# Patient Record
Sex: MALE | Race: WHITE | Employment: FULL TIME | URBAN - METROPOLITAN AREA
[De-identification: names, ages, dates, MRNs, and addresses within clinical notes are randomized per-mention and may not be internally consistent; named-entity substitution may affect disease eponyms.]

---

## 2023-09-01 ENCOUNTER — OFFICE VISIT (OUTPATIENT)
Dept: FAMILY MEDICINE CLINIC | Age: 23
End: 2023-09-01

## 2023-09-01 VITALS
HEIGHT: 66 IN | SYSTOLIC BLOOD PRESSURE: 122 MMHG | BODY MASS INDEX: 25.94 KG/M2 | RESPIRATION RATE: 12 BRPM | DIASTOLIC BLOOD PRESSURE: 70 MMHG | HEART RATE: 86 BPM | WEIGHT: 161.38 LBS | TEMPERATURE: 97.4 F | OXYGEN SATURATION: 99 %

## 2023-09-01 DIAGNOSIS — R50.9 FEVER, UNSPECIFIED: ICD-10-CM

## 2023-09-01 DIAGNOSIS — J01.00 ACUTE MAXILLARY SINUSITIS, RECURRENCE NOT SPECIFIED: Primary | ICD-10-CM

## 2023-09-01 LAB
INFLUENZA A ANTIBODY: NEGATIVE
INFLUENZA B ANTIBODY: NEGATIVE
Lab: NORMAL
PERFORMING INSTRUMENT: NORMAL
QC PASS/FAIL: NORMAL
SARS-COV-2, POC: NORMAL

## 2023-09-01 RX ORDER — AMOXICILLIN AND CLAVULANATE POTASSIUM 875; 125 MG/1; MG/1
1 TABLET, FILM COATED ORAL 2 TIMES DAILY
Qty: 14 TABLET | Refills: 0 | Status: SHIPPED | OUTPATIENT
Start: 2023-09-01 | End: 2023-09-08

## 2023-09-01 RX ORDER — FLUTICASONE PROPIONATE 50 MCG
2 SPRAY, SUSPENSION (ML) NASAL DAILY
Qty: 1 EACH | Refills: 0 | Status: SHIPPED | OUTPATIENT
Start: 2023-09-01 | End: 2023-10-01

## 2023-09-01 SDOH — ECONOMIC STABILITY: INCOME INSECURITY: HOW HARD IS IT FOR YOU TO PAY FOR THE VERY BASICS LIKE FOOD, HOUSING, MEDICAL CARE, AND HEATING?: NOT HARD AT ALL

## 2023-09-01 SDOH — ECONOMIC STABILITY: FOOD INSECURITY: WITHIN THE PAST 12 MONTHS, YOU WORRIED THAT YOUR FOOD WOULD RUN OUT BEFORE YOU GOT MONEY TO BUY MORE.: NEVER TRUE

## 2023-09-01 SDOH — ECONOMIC STABILITY: HOUSING INSECURITY
IN THE LAST 12 MONTHS, WAS THERE A TIME WHEN YOU DID NOT HAVE A STEADY PLACE TO SLEEP OR SLEPT IN A SHELTER (INCLUDING NOW)?: NO

## 2023-09-01 SDOH — ECONOMIC STABILITY: FOOD INSECURITY: WITHIN THE PAST 12 MONTHS, THE FOOD YOU BOUGHT JUST DIDN'T LAST AND YOU DIDN'T HAVE MONEY TO GET MORE.: NEVER TRUE

## 2023-09-01 ASSESSMENT — PATIENT HEALTH QUESTIONNAIRE - PHQ9
SUM OF ALL RESPONSES TO PHQ QUESTIONS 1-9: 0
2. FEELING DOWN, DEPRESSED OR HOPELESS: 0
SUM OF ALL RESPONSES TO PHQ QUESTIONS 1-9: 0
1. LITTLE INTEREST OR PLEASURE IN DOING THINGS: 0
SUM OF ALL RESPONSES TO PHQ9 QUESTIONS 1 & 2: 0

## 2023-09-01 ASSESSMENT — ENCOUNTER SYMPTOMS
ABDOMINAL PAIN: 0
FACIAL SWELLING: 1
SINUS PAIN: 1
VOMITING: 0
SHORTNESS OF BREATH: 0
SORE THROAT: 0
NAUSEA: 0
RHINORRHEA: 1
SINUS PRESSURE: 1
TROUBLE SWALLOWING: 0
VOICE CHANGE: 0
COUGH: 0
CHEST TIGHTNESS: 0
DIARRHEA: 0
WHEEZING: 0

## 2023-09-01 NOTE — PROGRESS NOTES
membrane, ear canal and external ear normal. There is no impacted cerumen. Nose: Congestion present. Right Turbinates: Swollen. Left Turbinates: Swollen. Right Sinus: Maxillary sinus tenderness and frontal sinus tenderness present. Left Sinus: No maxillary sinus tenderness or frontal sinus tenderness. Mouth/Throat:      Lips: Pink. Mouth: Mucous membranes are moist. No oral lesions. Tongue: No lesions. Palate: No lesions. Pharynx: Oropharynx is clear. Uvula midline. No pharyngeal swelling, oropharyngeal exudate, posterior oropharyngeal erythema or uvula swelling. Eyes:      General: Lids are normal.      Extraocular Movements: Extraocular movements intact. Conjunctiva/sclera: Conjunctivae normal.   Neck:      Trachea: Trachea normal.   Cardiovascular:      Rate and Rhythm: Normal rate and regular rhythm. Pulses: Normal pulses. Heart sounds: Normal heart sounds, S1 normal and S2 normal.   Pulmonary:      Effort: Pulmonary effort is normal.      Breath sounds: Normal breath sounds and air entry. Musculoskeletal:         General: Normal range of motion. Cervical back: Normal range of motion and neck supple. No tenderness. Lymphadenopathy:      Cervical: No cervical adenopathy. Skin:     General: Skin is warm and dry. Capillary Refill: Capillary refill takes less than 2 seconds. Neurological:      General: No focal deficit present. Mental Status: He is alert and oriented to person, place, and time. Mental status is at baseline. Psychiatric:         Attention and Perception: Attention and perception normal.         Mood and Affect: Mood and affect normal.         Speech: Speech normal.         Behavior: Behavior normal. Behavior is cooperative. Thought Content: Thought content normal.         Cognition and Memory: Cognition and memory normal.         Judgment: Judgment normal.       Assessment:       Diagnosis Orders   1.

## 2024-08-19 ENCOUNTER — TELEPHONE (OUTPATIENT)
Dept: FAMILY MEDICINE CLINIC | Age: 24
End: 2024-08-19

## 2024-08-19 ENCOUNTER — OFFICE VISIT (OUTPATIENT)
Dept: FAMILY MEDICINE CLINIC | Age: 24
End: 2024-08-19
Payer: COMMERCIAL

## 2024-08-19 VITALS
HEIGHT: 66 IN | WEIGHT: 180.2 LBS | OXYGEN SATURATION: 98 % | HEART RATE: 83 BPM | BODY MASS INDEX: 28.96 KG/M2 | DIASTOLIC BLOOD PRESSURE: 74 MMHG | TEMPERATURE: 97 F | SYSTOLIC BLOOD PRESSURE: 128 MMHG

## 2024-08-19 DIAGNOSIS — N50.89 TESTICLE LUMP: Primary | ICD-10-CM

## 2024-08-19 DIAGNOSIS — N45.1 EPIDIDYMITIS, LEFT: ICD-10-CM

## 2024-08-19 PROCEDURE — G8419 CALC BMI OUT NRM PARAM NOF/U: HCPCS | Performed by: FAMILY MEDICINE

## 2024-08-19 PROCEDURE — 1036F TOBACCO NON-USER: CPT | Performed by: FAMILY MEDICINE

## 2024-08-19 PROCEDURE — G8427 DOCREV CUR MEDS BY ELIG CLIN: HCPCS | Performed by: FAMILY MEDICINE

## 2024-08-19 PROCEDURE — 99214 OFFICE O/P EST MOD 30 MIN: CPT | Performed by: FAMILY MEDICINE

## 2024-08-19 RX ORDER — LAMOTRIGINE 150 MG/1
150 TABLET ORAL DAILY
COMMUNITY
Start: 2024-07-28

## 2024-08-19 RX ORDER — DOXYCYCLINE HYCLATE 100 MG
100 TABLET ORAL 2 TIMES DAILY
Qty: 14 TABLET | Refills: 0 | Status: SHIPPED | OUTPATIENT
Start: 2024-08-19 | End: 2024-08-26

## 2024-08-19 ASSESSMENT — PATIENT HEALTH QUESTIONNAIRE - PHQ9
SUM OF ALL RESPONSES TO PHQ QUESTIONS 1-9: 0
SUM OF ALL RESPONSES TO PHQ9 QUESTIONS 1 & 2: 0
1. LITTLE INTEREST OR PLEASURE IN DOING THINGS: NOT AT ALL
SUM OF ALL RESPONSES TO PHQ QUESTIONS 1-9: 0
2. FEELING DOWN, DEPRESSED OR HOPELESS: NOT AT ALL

## 2024-08-19 NOTE — TELEPHONE ENCOUNTER
----- Message from Nicho DELGADILLO sent at 8/19/2024  8:29 AM EDT -----  Regarding: ECC Escalation To Practice  ECC Escalation To Practice      Type of Escalation: Red Flag Symptom  --------------------------------------------------------------------------------------------------------------------------    Information for Provider:  Patient is looking for appointment for: Symptom genital pain.  Reasons for Message: Unable to reach practice     Additional Information patient do not want walk in clinic  --------------------------------------------------------------------------------------------------------------------------    Relationship to Patient: Guardian ,grandmother: Fabiola    Call Back Info: OK to leave message on voicemail  Preferred Call Back Number: Phone:[phone]

## 2024-08-19 NOTE — TELEPHONE ENCOUNTER
----- Message from Nicho JOHNS sent at 8/19/2024 12:23 PM EDT -----  Regarding: ECC Escalation To Practice  ECC Escalation To Practice      Type of Escalation: Red Flag Symptom  --------------------------------------------------------------------------------------------------------------------------    Information for Provider:Polo Luz, DO  Patient is looking for appointment for: Symptom - genital pain - testicles and there is also a lump    Reasons for Message: Unable to reach practice     Additional Information - genital pain for a long time - testicles and there is also a lump / seen by therapist and taking medications for anxiety    --------------------------------------------------------------------------------------------------------------------------    Relationship to Patient: Liat Stanley - Johny      Call Back Info: OK to leave message on voicemail  Preferred Call Back Number: Phone [phone]

## 2024-08-19 NOTE — PROGRESS NOTES
Subjective  Chuck Mancilla 2000 is a 24 y.o. male who presents today with:  Chief Complaint   Patient presents with    Testicle Pain     He found a lump on his left testicle on Saturday. Denies pain or swelling.        Testicle Pain      I have reviewed HPI and agree with above.     Review of Systems   All other systems reviewed and are negative.      History reviewed. No pertinent past medical history.  History reviewed. No pertinent surgical history.  Social History     Socioeconomic History    Marital status: Single     Spouse name: Not on file    Number of children: Not on file    Years of education: Not on file    Highest education level: Not on file   Occupational History    Not on file   Tobacco Use    Smoking status: Never    Smokeless tobacco: Never   Vaping Use    Vaping status: Some Days   Substance and Sexual Activity    Alcohol use: Never    Drug use: Never    Sexual activity: Not on file   Other Topics Concern    Not on file   Social History Narrative    Not on file     Social Determinants of Health     Financial Resource Strain: Low Risk  (9/1/2023)    Overall Financial Resource Strain (CARDIA)     Difficulty of Paying Living Expenses: Not hard at all   Food Insecurity: Not on file (9/1/2023)   Transportation Needs: Unknown (9/1/2023)    PRAPARE - Transportation     Lack of Transportation (Medical): Not on file     Lack of Transportation (Non-Medical): No   Physical Activity: Not on file   Stress: Not on file   Social Connections: Not on file   Intimate Partner Violence: Not on file   Housing Stability: Unknown (9/1/2023)    Housing Stability Vital Sign     Unable to Pay for Housing in the Last Year: Not on file     Number of Places Lived in the Last Year: Not on file     Unstable Housing in the Last Year: No     History reviewed. No pertinent family history.  No Known Allergies  Current Outpatient Medications   Medication Sig Dispense Refill    lamoTRIgine (LAMICTAL) 150 MG tablet Take 1

## 2024-09-06 ENCOUNTER — OFFICE VISIT (OUTPATIENT)
Dept: FAMILY MEDICINE CLINIC | Age: 24
End: 2024-09-06
Payer: COMMERCIAL

## 2024-09-06 VITALS
RESPIRATION RATE: 18 BRPM | WEIGHT: 180 LBS | DIASTOLIC BLOOD PRESSURE: 82 MMHG | TEMPERATURE: 97.2 F | SYSTOLIC BLOOD PRESSURE: 104 MMHG | OXYGEN SATURATION: 99 % | HEART RATE: 74 BPM | BODY MASS INDEX: 28.93 KG/M2 | HEIGHT: 66 IN

## 2024-09-06 DIAGNOSIS — N45.1 EPIDIDYMITIS, LEFT: ICD-10-CM

## 2024-09-06 DIAGNOSIS — R41.840 POOR CONCENTRATION: ICD-10-CM

## 2024-09-06 DIAGNOSIS — F33.1 MODERATE RECURRENT MAJOR DEPRESSION (HCC): Primary | ICD-10-CM

## 2024-09-06 DIAGNOSIS — N50.89 TESTICLE LUMP: ICD-10-CM

## 2024-09-06 PROCEDURE — G8419 CALC BMI OUT NRM PARAM NOF/U: HCPCS | Performed by: FAMILY MEDICINE

## 2024-09-06 PROCEDURE — G8427 DOCREV CUR MEDS BY ELIG CLIN: HCPCS | Performed by: FAMILY MEDICINE

## 2024-09-06 PROCEDURE — 99213 OFFICE O/P EST LOW 20 MIN: CPT | Performed by: FAMILY MEDICINE

## 2024-09-06 PROCEDURE — 1036F TOBACCO NON-USER: CPT | Performed by: FAMILY MEDICINE

## 2024-09-06 SDOH — ECONOMIC STABILITY: FOOD INSECURITY: WITHIN THE PAST 12 MONTHS, YOU WORRIED THAT YOUR FOOD WOULD RUN OUT BEFORE YOU GOT MONEY TO BUY MORE.: NEVER TRUE

## 2024-09-06 SDOH — ECONOMIC STABILITY: TRANSPORTATION INSECURITY
IN THE PAST 12 MONTHS, HAS LACK OF TRANSPORTATION KEPT YOU FROM MEETINGS, WORK, OR FROM GETTING THINGS NEEDED FOR DAILY LIVING?: YES

## 2024-09-06 SDOH — ECONOMIC STABILITY: FOOD INSECURITY: WITHIN THE PAST 12 MONTHS, THE FOOD YOU BOUGHT JUST DIDN'T LAST AND YOU DIDN'T HAVE MONEY TO GET MORE.: NEVER TRUE

## 2024-09-06 SDOH — ECONOMIC STABILITY: INCOME INSECURITY: HOW HARD IS IT FOR YOU TO PAY FOR THE VERY BASICS LIKE FOOD, HOUSING, MEDICAL CARE, AND HEATING?: NOT HARD AT ALL

## 2024-09-06 NOTE — PROGRESS NOTES
Subjective  Chuck Mancilla 2000 is a 24 y.o. male who presents today with:  Chief Complaint   Patient presents with    Testicle Pain     Patient is here for a follow up on a testicular lump. Patient was prescribed Doxycycline. Patient states the lump has improved significantly. Patient denies any pain.        Testicle Pain      I have reviewed HPI and agree with above.     Review of Systems   All other systems reviewed and are negative.      History reviewed. No pertinent past medical history.  History reviewed. No pertinent surgical history.  Social History     Socioeconomic History    Marital status: Single     Spouse name: Not on file    Number of children: Not on file    Years of education: Not on file    Highest education level: Not on file   Occupational History    Not on file   Tobacco Use    Smoking status: Never    Smokeless tobacco: Never   Vaping Use    Vaping status: Some Days   Substance and Sexual Activity    Alcohol use: Never    Drug use: Never    Sexual activity: Not on file   Other Topics Concern    Not on file   Social History Narrative    Not on file     Social Determinants of Health     Financial Resource Strain: Low Risk  (9/6/2024)    Overall Financial Resource Strain (CARDIA)     Difficulty of Paying Living Expenses: Not hard at all   Food Insecurity: No Food Insecurity (9/6/2024)    Hunger Vital Sign     Worried About Running Out of Food in the Last Year: Never true     Ran Out of Food in the Last Year: Never true   Transportation Needs: Unmet Transportation Needs (9/6/2024)    PRAPARE - Transportation     Lack of Transportation (Medical): Not on file     Lack of Transportation (Non-Medical): Yes   Physical Activity: Not on file   Stress: Not on file   Social Connections: Not on file   Intimate Partner Violence: Not on file   Housing Stability: Unknown (9/6/2024)    Housing Stability Vital Sign     Unable to Pay for Housing in the Last Year: Not on file     Number of Times Moved in